# Patient Record
Sex: MALE | Race: WHITE | NOT HISPANIC OR LATINO | Employment: PART TIME | URBAN - METROPOLITAN AREA
[De-identification: names, ages, dates, MRNs, and addresses within clinical notes are randomized per-mention and may not be internally consistent; named-entity substitution may affect disease eponyms.]

---

## 2022-04-09 ENCOUNTER — HISTORICAL (OUTPATIENT)
Dept: ADMINISTRATIVE | Facility: HOSPITAL | Age: 27
End: 2022-04-09

## 2022-04-25 VITALS
BODY MASS INDEX: 25.06 KG/M2 | DIASTOLIC BLOOD PRESSURE: 59 MMHG | SYSTOLIC BLOOD PRESSURE: 80 MMHG | HEIGHT: 70 IN | WEIGHT: 175.06 LBS

## 2022-09-12 ENCOUNTER — HOSPITAL ENCOUNTER (EMERGENCY)
Facility: HOSPITAL | Age: 27
Discharge: HOME | End: 2022-09-12
Attending: RADIOLOGY
Payer: COMMERCIAL

## 2022-09-12 VITALS
BODY MASS INDEX: 30.08 KG/M2 | WEIGHT: 203.06 LBS | HEIGHT: 69 IN | SYSTOLIC BLOOD PRESSURE: 131 MMHG | DIASTOLIC BLOOD PRESSURE: 87 MMHG | RESPIRATION RATE: 16 BRPM | OXYGEN SATURATION: 96 % | TEMPERATURE: 98 F | HEART RATE: 59 BPM

## 2022-09-12 DIAGNOSIS — T07.XXXA ABRASIONS OF MULTIPLE SITES: ICD-10-CM

## 2022-09-12 DIAGNOSIS — M79.602 LEFT ARM PAIN: ICD-10-CM
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
## 2022-09-12 DIAGNOSIS — S01.511A LIP LACERATION, INITIAL ENCOUNTER: ICD-10-CM

## 2022-09-12 DIAGNOSIS — S52.615D CLOSED NONDISPLACED FRACTURE OF STYLOID PROCESS OF LEFT ULNA WITH ROUTINE HEALING, SUBSEQUENT ENCOUNTER: ICD-10-CM

## 2022-09-12 PROCEDURE — 25000003 PHARM REV CODE 250: Performed by: PHYSICIAN ASSISTANT

## 2022-09-12 PROCEDURE — 29105 APPLICATION LONG ARM SPLINT: CPT | Mod: LT

## 2022-09-12 PROCEDURE — 29125 APPL SHORT ARM SPLINT STATIC: CPT | Mod: LT

## 2022-09-12 PROCEDURE — 99284 EMERGENCY DEPT VISIT MOD MDM: CPT | Mod: 25

## 2022-09-12 RX ORDER — DICLOFENAC SODIUM 50 MG/1
50 TABLET, DELAYED RELEASE ORAL 2 TIMES DAILY PRN
Qty: 20 TABLET | Refills: 0 | Status: SHIPPED | OUTPATIENT
Start: 2022-09-12

## 2022-09-12 RX ORDER — CHLORHEXIDINE GLUCONATE ORAL RINSE 1.2 MG/ML
15 SOLUTION DENTAL 2 TIMES DAILY
Qty: 300 ML | Refills: 0 | Status: SHIPPED | OUTPATIENT
Start: 2022-09-12 | End: 2022-09-22

## 2022-09-12 RX ADMIN — BACITRACIN, NEOMYCIN, POLYMYXIN B: 400; 3.5; 5 OINTMENT TOPICAL at 11:09

## 2022-09-12 NOTE — ED PROVIDER NOTES
Encounter Date: 9/12/2022       History     Chief Complaint   Patient presents with    Abrasion     Multiple abrasions/avulsions to the extremities; lip swelling after falling from a skateboard. Denies loss of consciousness.     Cecilio Azar is a 27 y.o. male with no significant past medical history who presents to the ED for evaluation after a fall from his motorized skateboard. He reports trying to stop and falling onto his left side, mainly onto his arm. He did the left side of his face on the concrete causing a small laceration to the inside of his upper lip. He also has abrasions to his right knee, right arm, and right palm. Lip laceration bled initially but has stopped. His main complaint is left forearm pain and swelling. He denies LOC, headache, vision changes, abdominal pain, decreased sensation.     The history is provided by the patient. No  was used.   Review of patient's allergies indicates:  No Known Allergies  History reviewed. No pertinent past medical history.  History reviewed. No pertinent surgical history.  History reviewed. No pertinent family history.  Social History     Tobacco Use    Smoking status: Never    Smokeless tobacco: Never     Review of Systems   Constitutional:  Negative for activity change, chills and fever.   HENT:  Negative for congestion and trouble swallowing.    Eyes:  Negative for photophobia and visual disturbance.   Respiratory:  Negative for chest tightness, shortness of breath and wheezing.    Cardiovascular:  Negative for chest pain, palpitations and leg swelling.   Gastrointestinal:  Negative for abdominal pain, constipation, diarrhea, nausea and vomiting.   Genitourinary:  Negative for dysuria, frequency, hematuria and urgency.   Musculoskeletal:  Positive for arthralgias. Negative for back pain, gait problem and neck pain.   Skin:  Positive for wound. Negative for color change and rash.   Neurological:  Negative for dizziness, syncope, weakness,  light-headedness, numbness and headaches.   Psychiatric/Behavioral:  Negative for agitation and confusion. The patient is not nervous/anxious.      Physical Exam     Initial Vitals [09/12/22 0923]   BP Pulse Resp Temp SpO2   131/87 (!) 59 16 97.9 °F (36.6 °C) 96 %      MAP       --         Physical Exam    Nursing note and vitals reviewed.  Constitutional: He appears well-developed and well-nourished. No distress.   HENT:   Head: Normocephalic.   Mouth/Throat: Lacerations (.5 cm laceration to the left inner upper lip. No active bleeding) present.   Eyes: EOM are normal. Pupils are equal, round, and reactive to light. No scleral icterus.   Neck: Neck supple.   Normal range of motion.  Cardiovascular:  Normal rate and regular rhythm.           No murmur heard.  Pulmonary/Chest: Breath sounds normal. No respiratory distress. He has no wheezes.   Abdominal: Abdomen is soft. He exhibits no distension.   Musculoskeletal:         General: Tenderness (left forearm) present.      Cervical back: Normal range of motion and neck supple.      Comments: Full ROM of left shoulder and wrist. Decreased ROM of left elbow 2/2 pain. Pain with internal and external rotation of left forearm. Pulses palpable. Sensation intact     Neurological: He is alert and oriented to person, place, and time. No cranial nerve deficit or sensory deficit.   Skin: Skin is warm and dry. Capillary refill takes less than 2 seconds. Abrasion noted.   Multiple small non-bleeding abrasions to upper and lower extremities.   Psychiatric: He has a normal mood and affect. His behavior is normal. Thought content normal.       ED Course   Procedures  Labs Reviewed - No data to display       Imaging Results              X-Ray Humerus 2 View Left (Final result)  Result time 09/12/22 10:54:51      Final result by Miguel Eden MD (09/12/22 10:54:51)                   Impression:      No acute fractures or subluxations are identified.      Electronically signed  by: Miguel Eden  Date:    09/12/2022  Time:    10:54               Narrative:    EXAMINATION:  XR HUMERUS 2 VIEW LEFT    CPT: 87377    CLINICAL HISTORY:  left arm pain, fall;    FINDINGS:  The bones and joints are unremarkable with no fractures or subluxations identified. The surrounding soft tissues are unremarkable with no posterior fat pad sign or radiopaque foreign bodies seen.                                       X-Ray Forearm Left (Final result)  Result time 09/12/22 10:53:24      Final result by Miguel Eden MD (09/12/22 10:53:24)                   Impression:      Abnormality of the ulnar styloid likely related to remote trauma clinical correlation is suggested to rule out an acute fracture.    No other abnormalities      Electronically signed by: Miguel Eden  Date:    09/12/2022  Time:    10:53               Narrative:    EXAMINATION:  XR FOREARM LEFT    CLINICAL HISTORY:  Pain in left arm    COMPARISON:  None.    FINDINGS:  There is an abnormality of the ulnar styloid that appears to be well corticated likely related to old trauma, however, clinical correlation is suggested to rule out an acute fracture    No acute displaced fractures or dislocations.    Joint spaces preserved.    No blastic or lytic lesions.    Soft tissues within normal limits.                                       Medications   neomycin-bacitracnZn-polymyxnB packet (has no administration in time range)     Medical Decision Making:   Patient declined suturing of upper inner lip laceration, he says he will return if it starts bleeding again. Discussed importance of peridex rinses. XR findings of left ulnar styloid process fracture, splint placement, and ortho referral. Patient agreeable to plan and verbalized understanding. All questions answered.                     Clinical Impression:   Final diagnoses:  [M79.602] Left arm pain  [T07.XXXA] Abrasions of multiple sites  [V00.131A] Fall from skateboard, initial  encounter (Primary)  [S52.615D] Closed nondisplaced fracture of styloid process of left ulna with routine healing, subsequent encounter  [S01.511A] Lip laceration, initial encounter        ED Disposition Condition    Discharge Stable          ED Prescriptions       Medication Sig Dispense Start Date End Date Auth. Provider    chlorhexidine (PERIDEX) 0.12 % solution Use as directed 15 mLs in the mouth or throat 2 (two) times daily. for 10 days 300 mL 9/12/2022 9/22/2022 Shruthi Man PA-C    diclofenac (VOLTAREN) 50 MG EC tablet Take 1 tablet (50 mg total) by mouth 2 (two) times daily as needed (pain). 20 tablet 9/12/2022 -- Shruthi Man PA-C          Follow-up Information       Follow up With Specialties Details Why Contact Info    [facility] Emergency Medicine  If symptoms worsen [address] [facility] [id number] [phone]             Shruthi Man PA-C  09/12/22 1565

## 2022-09-21 ENCOUNTER — HOSPITAL ENCOUNTER (OUTPATIENT)
Dept: RADIOLOGY | Facility: HOSPITAL | Age: 27
Discharge: HOME | End: 2022-09-21
Attending: STUDENT IN AN ORGANIZED HEALTH CARE EDUCATION/TRAINING PROGRAM
Payer: COMMERCIAL

## 2022-09-21 ENCOUNTER — OFFICE VISIT (OUTPATIENT)
Dept: ORTHOPEDICS | Facility: CLINIC | Age: 27
End: 2022-09-21
Payer: COMMERCIAL

## 2022-09-21 VITALS
BODY MASS INDEX: 29.47 KG/M2 | SYSTOLIC BLOOD PRESSURE: 117 MMHG | DIASTOLIC BLOOD PRESSURE: 84 MMHG | RESPIRATION RATE: 16 BRPM | WEIGHT: 199 LBS | HEART RATE: 94 BPM | HEIGHT: 69 IN

## 2022-09-21 DIAGNOSIS — M25.532 LEFT WRIST PAIN: ICD-10-CM

## 2022-09-21 DIAGNOSIS — M79.631 RIGHT FOREARM PAIN: ICD-10-CM

## 2022-09-21 DIAGNOSIS — S52.615A CLOSED NONDISPLACED FRACTURE OF STYLOID PROCESS OF LEFT ULNA, INITIAL ENCOUNTER: ICD-10-CM

## 2022-09-21 DIAGNOSIS — M25.532 LEFT WRIST PAIN: Primary | ICD-10-CM

## 2022-09-21 PROCEDURE — 25600 CLTX DST RDL FX/EPHYS SEP WO: CPT | Mod: PBBFAC,LT | Performed by: STUDENT IN AN ORGANIZED HEALTH CARE EDUCATION/TRAINING PROGRAM

## 2022-09-21 PROCEDURE — 3074F SYST BP LT 130 MM HG: CPT | Mod: CPTII,,, | Performed by: STUDENT IN AN ORGANIZED HEALTH CARE EDUCATION/TRAINING PROGRAM

## 2022-09-21 PROCEDURE — 73090 X-RAY EXAM OF FOREARM: CPT | Mod: TC,LT

## 2022-09-21 PROCEDURE — 3008F BODY MASS INDEX DOCD: CPT | Mod: CPTII,,, | Performed by: STUDENT IN AN ORGANIZED HEALTH CARE EDUCATION/TRAINING PROGRAM

## 2022-09-21 PROCEDURE — 25600 CLTX DST RDL FX/EPHYS SEP WO: CPT | Mod: S$PBB,LT,, | Performed by: STUDENT IN AN ORGANIZED HEALTH CARE EDUCATION/TRAINING PROGRAM

## 2022-09-21 PROCEDURE — 99214 OFFICE O/P EST MOD 30 MIN: CPT | Mod: PBBFAC,25 | Performed by: STUDENT IN AN ORGANIZED HEALTH CARE EDUCATION/TRAINING PROGRAM

## 2022-09-21 PROCEDURE — 1159F MED LIST DOCD IN RCRD: CPT | Mod: CPTII,,, | Performed by: STUDENT IN AN ORGANIZED HEALTH CARE EDUCATION/TRAINING PROGRAM

## 2022-09-21 PROCEDURE — 1159F PR MEDICATION LIST DOCUMENTED IN MEDICAL RECORD: ICD-10-PCS | Mod: CPTII,,, | Performed by: STUDENT IN AN ORGANIZED HEALTH CARE EDUCATION/TRAINING PROGRAM

## 2022-09-21 PROCEDURE — 3074F PR MOST RECENT SYSTOLIC BLOOD PRESSURE < 130 MM HG: ICD-10-PCS | Mod: CPTII,,, | Performed by: STUDENT IN AN ORGANIZED HEALTH CARE EDUCATION/TRAINING PROGRAM

## 2022-09-21 PROCEDURE — 73110 X-RAY EXAM OF WRIST: CPT | Mod: TC,LT

## 2022-09-21 PROCEDURE — 3079F DIAST BP 80-89 MM HG: CPT | Mod: CPTII,,, | Performed by: STUDENT IN AN ORGANIZED HEALTH CARE EDUCATION/TRAINING PROGRAM

## 2022-09-21 PROCEDURE — 25600 PR CLOSED RX DIST RAD/ULNA FX: ICD-10-PCS | Mod: S$PBB,LT,, | Performed by: STUDENT IN AN ORGANIZED HEALTH CARE EDUCATION/TRAINING PROGRAM

## 2022-09-21 PROCEDURE — 3079F PR MOST RECENT DIASTOLIC BLOOD PRESSURE 80-89 MM HG: ICD-10-PCS | Mod: CPTII,,, | Performed by: STUDENT IN AN ORGANIZED HEALTH CARE EDUCATION/TRAINING PROGRAM

## 2022-09-21 PROCEDURE — 99204 OFFICE O/P NEW MOD 45 MIN: CPT | Mod: 57,S$PBB,, | Performed by: STUDENT IN AN ORGANIZED HEALTH CARE EDUCATION/TRAINING PROGRAM

## 2022-09-21 PROCEDURE — 3008F PR BODY MASS INDEX (BMI) DOCUMENTED: ICD-10-PCS | Mod: CPTII,,, | Performed by: STUDENT IN AN ORGANIZED HEALTH CARE EDUCATION/TRAINING PROGRAM

## 2022-09-21 PROCEDURE — 99204 PR OFFICE/OUTPT VISIT, NEW, LEVL IV, 45-59 MIN: ICD-10-PCS | Mod: 57,S$PBB,, | Performed by: STUDENT IN AN ORGANIZED HEALTH CARE EDUCATION/TRAINING PROGRAM

## 2022-09-21 NOTE — PROGRESS NOTES
"  Subjective:       New pt   Patient ID: Cecilio Azar is a Right dominate 27 y.o. male. Non-smoker. Employment HX: used to work at [employer], quit today,     Chief Complaint: Pain of the Left Forearm    HPI:    Pt fell off of motorized skateboard on 9/12/22, fell onto outstretched hand.   Pain level:3 /10  Modifying Factors: worse with activity  Associated Symptoms: swelling unrelated to activity  Activity: sedentary with light activity.   Previous Treatments: RX NSAIDs         Current Outpatient Medications:     chlorhexidine (PERIDEX) 0.12 % solution, Use as directed 15 mLs in the mouth or throat 2 (two) times daily. for 10 days, Disp: 300 mL, Rfl: 0    diclofenac (VOLTAREN) 50 MG EC tablet, Take 1 tablet (50 mg total) by mouth 2 (two) times daily as needed (pain)., Disp: 20 tablet, Rfl: 0    Review of patient's allergies indicates:  No Known Allergies    No results found for: LABA1C  Body mass index is 29.39 kg/m².   Vitals:    09/21/22 1343   BP: 117/84   Pulse: 94   Resp: 16   Weight: 90.3 kg (199 lb)   Height: 5' 9" (1.753 m)   PainSc: 0-No pain        ROS  Review of Systems:  A ten-point review of systems was performed and is negative, except as mentioned above.        Objective:      General: well developed; well nourished; cooperative  PSYCH: alert and oriented with appropriate mood and affect  SKIN: inspection and palpation of skin and soft tissue normal;  CV: vascular integrity noted; +2 symmetrical pulses  Resp: Normal work of breathing, quiet breathing    left upper extremity    Skin: no warmth or erythema; without signs of infection; mild eliecer  Palpation:  tenderness over fracture site present  ROM: limited due to post immobilization stiffness and soreness  Sensation: R/U/M nerves intact to light touch  Motor: AIN/PIN/U nerves intact  Vascular: +2 RP; capillary refill < 5 seconds     Imaging:  X-ray reviewed and independently interpreted by me.  Discussed with patient.    As per my interpretation of " this patients left hand plain film he has a nondisplaced ulnar styloid avulsion fracture. No other fracture or dislocation noted      Assessment:           1. Left wrist pain    2. Closed nondisplaced fracture of styloid process of left ulna, initial encounter          Plan:       Orders Placed This Encounter    X-Ray Wrist Complete Left     27 y.o.  patient presenting for evaluation of Pain of the Left Forearm   secondary to 1 week old ulnar styloid fracture after recent fall. Pt meets non operative criteria for management    moderately exacerbated.   Radiological studies ordered and interpreted by me, my independent interpretation attached, reviewed my findings with patient and showed them their images  Pt put in velcro wrist brace for compression and immobilization  WB and Activity as tolerated, behavior modification encouraged  Encouraged HEP daily, Ice/Heat prn, NSAIDs/Tylenol/topical anasthetics PRN, med precautions given,   Follow up 6 weeks , pt will repeat XR of left hand upon arrival     Brandi Ch MD